# Patient Record
Sex: FEMALE | Race: WHITE | URBAN - METROPOLITAN AREA
[De-identification: names, ages, dates, MRNs, and addresses within clinical notes are randomized per-mention and may not be internally consistent; named-entity substitution may affect disease eponyms.]

---

## 2017-02-01 ENCOUNTER — TELEPHONE (OUTPATIENT)
Dept: INTERNAL MEDICINE CLINIC | Age: 57
End: 2017-02-01

## 2017-02-01 RX ORDER — ZOLPIDEM TARTRATE 5 MG/1
5 TABLET ORAL
Qty: 30 TAB | Refills: 2 | Status: SHIPPED | OUTPATIENT
Start: 2017-02-01 | End: 2018-12-10 | Stop reason: ALTCHOICE

## 2017-02-01 RX ORDER — ZOLPIDEM TARTRATE 10 MG/1
10 TABLET ORAL
Qty: 30 TAB | Refills: 2 | Status: SHIPPED | OUTPATIENT
Start: 2017-02-01 | End: 2017-02-01 | Stop reason: DRUGHIGH

## 2017-02-01 NOTE — TELEPHONE ENCOUNTER
Pt stated medication prescribed for sleep at last office visit is not helping and would like a different medication called to her pharmacy(number on file) that will help her to stay asleep.    Best contact number [phone] or ([phone].      From answering service

## 2017-02-02 ENCOUNTER — TELEPHONE (OUTPATIENT)
Dept: INTERNAL MEDICINE CLINIC | Age: 57
End: 2017-02-02

## 2017-02-02 NOTE — TELEPHONE ENCOUNTER
Pt returning call back to practice. Best contact number [phone].       From answering service

## 2017-02-02 NOTE — TELEPHONE ENCOUNTER
Spoke with the patient using 2 identifiers. Notified her per Dr Quang Wortihngton that he ordered PRN only Ambien. She states understanding. Clonidine is also for blood pressure. She states that she does not check her blood pressure. She will continue taking Clonidine.

## 2017-03-02 RX ORDER — LEVOTHYROXINE SODIUM 88 UG/1
TABLET ORAL
Qty: 90 TAB | Refills: 0 | Status: SHIPPED | OUTPATIENT
Start: 2017-03-02 | End: 2017-06-05 | Stop reason: SDUPTHER

## 2017-07-04 DIAGNOSIS — I10 HTN (HYPERTENSION), BENIGN: ICD-10-CM

## 2017-07-05 RX ORDER — CLONIDINE HYDROCHLORIDE 0.2 MG/1
TABLET ORAL
Qty: 30 TAB | Refills: 0 | Status: SHIPPED | OUTPATIENT
Start: 2017-07-05 | End: 2017-08-06 | Stop reason: SDUPTHER

## 2017-09-05 RX ORDER — LEVOTHYROXINE SODIUM 88 UG/1
TABLET ORAL
Qty: 90 TAB | Refills: 0 | Status: SHIPPED | OUTPATIENT
Start: 2017-09-05 | End: 2017-12-08 | Stop reason: SDUPTHER

## 2017-09-14 DIAGNOSIS — I10 HTN (HYPERTENSION), BENIGN: ICD-10-CM

## 2017-09-14 RX ORDER — CLONIDINE HYDROCHLORIDE 0.2 MG/1
TABLET ORAL
Qty: 30 TAB | Refills: 5 | Status: SHIPPED | OUTPATIENT
Start: 2017-09-14 | End: 2018-12-10 | Stop reason: ALTCHOICE

## 2017-09-18 DIAGNOSIS — I10 HTN (HYPERTENSION), BENIGN: ICD-10-CM

## 2017-09-18 RX ORDER — CLONIDINE HYDROCHLORIDE 0.2 MG/1
TABLET ORAL
Qty: 90 TAB | Refills: 1 | OUTPATIENT
Start: 2017-09-18

## 2017-11-03 ENCOUNTER — TELEPHONE (OUTPATIENT)
Dept: INTERNAL MEDICINE CLINIC | Age: 57
End: 2017-11-03

## 2017-11-03 NOTE — TELEPHONE ENCOUNTER
Patient called in stating that [facility] There, she was told many bloodclots are formed in here leg. One looks like a potential issue for DVT in the future. The rad report is being sent to our office and the patient would like a call back on what to do.  She was advised to go to the ER but wanted to talk to her dr valdez.  [phone]

## 2017-11-03 NOTE — TELEPHONE ENCOUNTER
Writer spoke with patient and informed patient of Dr Sudhir Hollis recommendations, patient states understanding

## 2017-11-03 NOTE — TELEPHONE ENCOUNTER
Reviewed Doppler. Extensive but superficial thrombosis. Elevate leg, most heat. Continue to walk and exercise. No risk of pulmonary embolism. Would start 325 ASA with food until resolves.

## 2017-12-10 RX ORDER — LEVOTHYROXINE SODIUM 88 UG/1
TABLET ORAL
Qty: 90 TAB | Refills: 0 | Status: SHIPPED | OUTPATIENT
Start: 2017-12-10 | End: 2018-04-07 | Stop reason: SDUPTHER

## 2018-02-24 DIAGNOSIS — I10 HTN (HYPERTENSION), BENIGN: ICD-10-CM

## 2018-02-25 RX ORDER — CLONIDINE HYDROCHLORIDE 0.2 MG/1
TABLET ORAL
Qty: 30 TAB | Refills: 0 | OUTPATIENT
Start: 2018-02-25

## 2018-03-09 NOTE — TELEPHONE ENCOUNTER
Left detailed message to inform the patient per Dr Pedrito Blackburn that she has not had lab work since 2016. Prior to any refills are given she must be seen in the office. Asked for a return call to get her scheduled.

## 2018-04-09 RX ORDER — LEVOTHYROXINE SODIUM 88 UG/1
TABLET ORAL
Qty: 90 TAB | Refills: 0 | Status: SHIPPED | OUTPATIENT
Start: 2018-04-09 | End: 2018-12-10 | Stop reason: ALTCHOICE

## 2018-12-10 ENCOUNTER — OFFICE VISIT (OUTPATIENT)
Dept: INTERNAL MEDICINE CLINIC | Age: 58
End: 2018-12-10

## 2018-12-10 VITALS
DIASTOLIC BLOOD PRESSURE: 80 MMHG | HEART RATE: 70 BPM | WEIGHT: 216 LBS | TEMPERATURE: 97.6 F | HEIGHT: 64 IN | BODY MASS INDEX: 36.88 KG/M2 | RESPIRATION RATE: 18 BRPM | OXYGEN SATURATION: 97 % | SYSTOLIC BLOOD PRESSURE: 170 MMHG

## 2018-12-10 DIAGNOSIS — E03.3 POSTINFECTIOUS HYPOTHYROIDISM: Primary | ICD-10-CM

## 2018-12-10 DIAGNOSIS — I10 HTN (HYPERTENSION), BENIGN: ICD-10-CM

## 2018-12-10 DIAGNOSIS — Z12.11 COLON CANCER SCREENING: ICD-10-CM

## 2018-12-10 DIAGNOSIS — R20.2 TINGLING IN EXTREMITIES: ICD-10-CM

## 2018-12-10 RX ORDER — LISINOPRIL AND HYDROCHLOROTHIAZIDE 10; 12.5 MG/1; MG/1
1 TABLET ORAL DAILY
Qty: 90 TAB | Refills: 0 | Status: SHIPPED | OUTPATIENT
Start: 2018-12-10 | End: 2019-04-02 | Stop reason: ALTCHOICE

## 2018-12-10 RX ORDER — LISINOPRIL AND HYDROCHLOROTHIAZIDE 10; 12.5 MG/1; MG/1
1 TABLET ORAL DAILY
Qty: 90 TAB | Refills: 0 | Status: SHIPPED | OUTPATIENT
Start: 2018-12-10 | End: 2018-12-10 | Stop reason: SDUPTHER

## 2019-02-10 RX ORDER — LEVOTHYROXINE SODIUM 88 UG/1
TABLET ORAL
Qty: 90 TAB | Refills: 0 | Status: SHIPPED | OUTPATIENT
Start: 2019-02-10 | End: 2019-04-02 | Stop reason: SDUPTHER

## 2019-04-01 LAB
ALBUMIN SERPL-MCNC: 4.4 G/DL (ref 3.5–5.5)
ALBUMIN/GLOB SERPL: 1.7 {RATIO} (ref 1.2–2.2)
ALP SERPL-CCNC: 48 IU/L (ref 39–117)
ALT SERPL-CCNC: 21 IU/L (ref 0–32)
AST SERPL-CCNC: 16 IU/L (ref 0–40)
BASOPHILS # BLD AUTO: 0 X10E3/UL (ref 0–0.2)
BASOPHILS NFR BLD AUTO: 1 %
BILIRUB SERPL-MCNC: 0.4 MG/DL (ref 0–1.2)
BUN SERPL-MCNC: 15 MG/DL (ref 6–24)
BUN/CREAT SERPL: 18 (ref 9–23)
CALCIUM SERPL-MCNC: 9.2 MG/DL (ref 8.7–10.2)
CHLORIDE SERPL-SCNC: 105 MMOL/L (ref 96–106)
CHOLEST SERPL-MCNC: 196 MG/DL (ref 100–199)
CO2 SERPL-SCNC: 23 MMOL/L (ref 20–29)
CREAT SERPL-MCNC: 0.85 MG/DL (ref 0.57–1)
EOSINOPHIL # BLD AUTO: 0.2 X10E3/UL (ref 0–0.4)
EOSINOPHIL NFR BLD AUTO: 4 %
ERYTHROCYTE [DISTWIDTH] IN BLOOD BY AUTOMATED COUNT: 13.7 % (ref 12.3–15.4)
GLOBULIN SER CALC-MCNC: 2.6 G/DL (ref 1.5–4.5)
GLUCOSE SERPL-MCNC: 104 MG/DL (ref 65–99)
HCT VFR BLD AUTO: 42.4 % (ref 34–46.6)
HDLC SERPL-MCNC: 60 MG/DL
HGB BLD-MCNC: 13.6 G/DL (ref 11.1–15.9)
IMM GRANULOCYTES # BLD AUTO: 0 X10E3/UL (ref 0–0.1)
IMM GRANULOCYTES NFR BLD AUTO: 0 %
INTERPRETATION, 910389: NORMAL
LDLC SERPL CALC-MCNC: 118 MG/DL (ref 0–99)
LYMPHOCYTES # BLD AUTO: 1.8 X10E3/UL (ref 0.7–3.1)
LYMPHOCYTES NFR BLD AUTO: 42 %
Lab: NORMAL
MCH RBC QN AUTO: 32.5 PG (ref 26.6–33)
MCHC RBC AUTO-ENTMCNC: 32.1 G/DL (ref 31.5–35.7)
MCV RBC AUTO: 101 FL (ref 79–97)
METHYLMALONATE SERPL-SCNC: 124 NMOL/L (ref 0–378)
MONOCYTES # BLD AUTO: 0.4 X10E3/UL (ref 0.1–0.9)
MONOCYTES NFR BLD AUTO: 9 %
NEUTROPHILS # BLD AUTO: 1.9 X10E3/UL (ref 1.4–7)
NEUTROPHILS NFR BLD AUTO: 44 %
PLATELET # BLD AUTO: 257 X10E3/UL (ref 150–379)
POTASSIUM SERPL-SCNC: 4.4 MMOL/L (ref 3.5–5.2)
PROT SERPL-MCNC: 7 G/DL (ref 6–8.5)
RBC # BLD AUTO: 4.19 X10E6/UL (ref 3.77–5.28)
SODIUM SERPL-SCNC: 142 MMOL/L (ref 134–144)
TRIGL SERPL-MCNC: 88 MG/DL (ref 0–149)
TSH SERPL DL<=0.005 MIU/L-ACNC: 1.54 UIU/ML (ref 0.45–4.5)
VIT B12 SERPL-MCNC: 327 PG/ML (ref 232–1245)
VLDLC SERPL CALC-MCNC: 18 MG/DL (ref 5–40)
WBC # BLD AUTO: 4.3 X10E3/UL (ref 3.4–10.8)

## 2019-04-02 ENCOUNTER — OFFICE VISIT (OUTPATIENT)
Dept: INTERNAL MEDICINE CLINIC | Age: 59
End: 2019-04-02

## 2019-04-02 VITALS
HEART RATE: 66 BPM | OXYGEN SATURATION: 97 % | WEIGHT: 214 LBS | TEMPERATURE: 96.9 F | SYSTOLIC BLOOD PRESSURE: 127 MMHG | HEIGHT: 64 IN | BODY MASS INDEX: 36.54 KG/M2 | DIASTOLIC BLOOD PRESSURE: 80 MMHG | RESPIRATION RATE: 16 BRPM

## 2019-04-02 DIAGNOSIS — I10 HTN (HYPERTENSION), BENIGN: Primary | ICD-10-CM

## 2019-04-02 DIAGNOSIS — E03.3 POSTINFECTIOUS HYPOTHYROIDISM: ICD-10-CM

## 2019-04-02 DIAGNOSIS — E66.01 SEVERE OBESITY (HCC): ICD-10-CM

## 2019-04-02 RX ORDER — LEVOTHYROXINE SODIUM 88 UG/1
TABLET ORAL
Qty: 90 TAB | Refills: 3 | Status: SHIPPED | OUTPATIENT
Start: 2019-04-02 | End: 2019-06-16 | Stop reason: SDUPTHER

## 2019-04-02 RX ORDER — VALSARTAN AND HYDROCHLOROTHIAZIDE 80; 12.5 MG/1; MG/1
1 TABLET, FILM COATED ORAL DAILY
Qty: 90 TAB | Refills: 1 | Status: SHIPPED | OUTPATIENT
Start: 2019-04-02 | End: 2019-09-26 | Stop reason: SDUPTHER

## 2019-04-02 NOTE — PROGRESS NOTES
1. Have you been to the ER, urgent care clinic since your last visit? Hospitalized since your last visit? [facility] a [facility] 2. Have you seen or consulted any other health care providers outside of the [address] [place] [address] since your last visit? Include any pap smears or colon screening.  No

## 2019-04-02 NOTE — PROGRESS NOTES
Chief Complaint Patient presents with  Hypertension  Hypothyroidism  
off meds for many months Subjective:  She was told to be hypothyroid a number of years ago. When I saw her she was taking thyroid medicine and her TSH was euthyroid. Her periods have stopped and she has had no periods for a year. She sees [facility]. She has had some weight gain. She has not had any change in her voice, bowels, skin, and no bradycardia. She has had some weight gain. Her appetite is good, her bowel function is normal.  She takes magnesium daily to help her sleep. Her insomnia continues to be an issue . She saw psychiatry for a while. She took Clonidine for a while for sleep. It was not a very good blood pressure drug. She has a strong family history for hypertension and her blood pressure two years ago was over 150. Denies chest pain or shortness of breath. Denies heart palpitations. Does not exercise. Drinks one or two ultra light beers per day and feels this is a habit as she can stop for a week and have no issues. Vitals:  
 04/02/19 1638 BP: 146/79 Pulse: 66 Resp: 16 Temp: 96.9 °F (36.1 °C) TempSrc: Oral  
SpO2: 97% Weight: 214 lb (97.1 kg) Height: 5' 4\" (1.626 m) Vitals:  
 04/02/19 1638 04/02/19 1652 BP: 146/79 127/80 Pulse: 66 Resp: 16 Temp: 96.9 °F (36.1 °C) TempSrc: Oral   
SpO2: 97% Weight: 214 lb (97.1 kg) Height: 5' 4\" (1.626 m) Physical Examination:  BP: . S1, S2 was regular without a murmur. Lungs clear. Abdomen soft. Lab Results Component Value Date/Time WBC 4.3 03/28/2019 11:11 AM  
 HGB 13.6 03/28/2019 11:11 AM  
 HCT 42.4 03/28/2019 11:11 AM  
 PLATELET 335 87/79/7464 11:11 AM  
  (H) 03/28/2019 11:11 AM  
 
Lab Results Component Value Date/Time  Cholesterol, total 196 03/28/2019 11:11 AM  
 HDL Cholesterol 60 03/28/2019 11:11 AM  
 LDL, calculated 118 (H) 03/28/2019 11:11 AM  
 Triglyceride 88 03/28/2019 11:11 AM  
 
Lab Results Component Value Date/Time GFR est non-AA 76 03/28/2019 11:11 AM  
 GFR est AA 88 03/28/2019 11:11 AM  
 Creatinine 0.85 03/28/2019 11:11 AM  
 BUN 15 03/28/2019 11:11 AM  
 Sodium 142 03/28/2019 11:11 AM  
 Potassium 4.4 03/28/2019 11:11 AM  
 Chloride 105 03/28/2019 11:11 AM  
 CO2 23 03/28/2019 11:11 AM  
 
Lab Results Component Value Date/Time TSH 1.540 03/28/2019 11:11 AM  
  
Lab Results Component Value Date/Time Glucose 104 (H) 03/28/2019 11:11 AM  
  
 
ROS:  She needs screening colonoscopy,. Her ROS is positive for tingling of the upper extremities from the wrist to the elbows. Comes spontaneously, could be carpal tunnel. She sits at the computer all day. The fingers, however, do not tingle very much. She has had no cardiovascular symptoms. Patient Active Problem List  
 Diagnosis  Severe obesity ([address]  HTN (hypertension), benign  Hypothyroid  Elevated blood pressure reading without diagnosis of hypertension Vitals:  
 04/02/19 1638 BP: 146/79 Pulse: 66 Resp: 16 Temp: 96.9 °F (36.1 °C) TempSrc: Oral  
SpO2: 97% Weight: 214 lb (97.1 kg) Height: 5' 4\" (1.626 m) Wt Readings from Last 3 Encounters:  
04/02/19 214 lb (97.1 kg) 12/10/18 216 lb (98 kg)  
11/29/16 202 lb (91.6 kg) Lab Results Component Value Date/Time TSH 1.540 03/28/2019 11:11 AM  
 
Lab Results Component Value Date/Time Cholesterol, total 196 03/28/2019 11:11 AM  
 HDL Cholesterol 60 03/28/2019 11:11 AM  
 LDL, calculated 118 (H) 03/28/2019 11:11 AM  
 VLDL, calculated 18 03/28/2019 11:11 AM  
 Triglyceride 88 03/28/2019 11:11 AM  
 
Lab Results Component Value Date/Time Glucose 104 (H) 03/28/2019 11:11 AM  
 
 
 
 
 
 
EKG normal 
 
 
 
Diagnoses and all orders for this visit: 1. HTN (hypertension), benign 2. Severe obesity ([address] 3. Postinfectious hypothyroidism Other orders -     valsartan-hydroCHLOROthiazide (DIOVAN-HCT) 80-12.5 mg per tablet; Take 1 Tab by mouth daily. -     levothyroxine (SYNTHROID) 88 mcg tablet; TAKE ONE TABLET BY MOUTH ONE TIME DAILY BEFORE BREAKFAST  Indications: hypothyroidism Weight loss advised Due to cough will change BP meds

## 2019-06-18 RX ORDER — LEVOTHYROXINE SODIUM 88 UG/1
TABLET ORAL
Qty: 90 TAB | Refills: 0 | Status: SHIPPED | OUTPATIENT
Start: 2019-06-18 | End: 2019-09-13 | Stop reason: SDUPTHER

## 2019-09-13 RX ORDER — LEVOTHYROXINE SODIUM 88 UG/1
TABLET ORAL
Qty: 30 TAB | Refills: 2 | Status: SHIPPED | OUTPATIENT
Start: 2019-09-13 | End: 2019-12-15 | Stop reason: SDUPTHER

## 2019-09-26 RX ORDER — VALSARTAN AND HYDROCHLOROTHIAZIDE 80; 12.5 MG/1; MG/1
TABLET, FILM COATED ORAL
Qty: 30 TAB | Refills: 5 | Status: SHIPPED | OUTPATIENT
Start: 2019-09-26 | End: 2020-04-02

## 2019-12-16 RX ORDER — LEVOTHYROXINE SODIUM 88 UG/1
TABLET ORAL
Qty: 30 TAB | Refills: 2 | Status: SHIPPED | OUTPATIENT
Start: 2019-12-16 | End: 2020-03-16

## 2020-03-16 RX ORDER — LEVOTHYROXINE SODIUM 88 UG/1
TABLET ORAL
Qty: 30 TAB | Refills: 2 | Status: SHIPPED | OUTPATIENT
Start: 2020-03-16 | End: 2020-06-18 | Stop reason: SDUPTHER

## 2020-04-02 RX ORDER — VALSARTAN AND HYDROCHLOROTHIAZIDE 80; 12.5 MG/1; MG/1
TABLET, FILM COATED ORAL
Qty: 30 TAB | Refills: 1 | Status: SHIPPED | OUTPATIENT
Start: 2020-04-02 | End: 2020-06-12

## 2020-06-04 RX ORDER — VALSARTAN AND HYDROCHLOROTHIAZIDE 80; 12.5 MG/1; MG/1
TABLET, FILM COATED ORAL
Qty: 30 TAB | Refills: 1 | OUTPATIENT
Start: 2020-06-04

## 2020-06-12 NOTE — TELEPHONE ENCOUNTER
Patient is out of medication and is schedule on Monday for a virtual visit with , pt has been out for a week

## 2020-06-14 RX ORDER — VALSARTAN AND HYDROCHLOROTHIAZIDE 80; 12.5 MG/1; MG/1
TABLET, FILM COATED ORAL
Qty: 30 TAB | Refills: 0 | Status: SHIPPED | OUTPATIENT
Start: 2020-06-14 | End: 2020-06-18 | Stop reason: SDUPTHER

## 2020-06-18 ENCOUNTER — VIRTUAL VISIT (OUTPATIENT)
Dept: INTERNAL MEDICINE CLINIC | Age: 60
End: 2020-06-18

## 2020-06-18 DIAGNOSIS — I10 HTN (HYPERTENSION), BENIGN: Primary | ICD-10-CM

## 2020-06-18 DIAGNOSIS — E03.3 POSTINFECTIOUS HYPOTHYROIDISM: ICD-10-CM

## 2020-06-18 RX ORDER — VALSARTAN AND HYDROCHLOROTHIAZIDE 80; 12.5 MG/1; MG/1
1 TABLET, FILM COATED ORAL DAILY
Qty: 90 TAB | Refills: 3 | Status: SHIPPED | OUTPATIENT
Start: 2020-06-18 | End: 2021-07-08 | Stop reason: DRUGHIGH

## 2020-06-18 RX ORDER — LEVOTHYROXINE SODIUM 88 UG/1
88 TABLET ORAL
Qty: 90 TAB | Refills: 3 | Status: SHIPPED | OUTPATIENT
Start: 2020-06-18 | End: 2021-07-08 | Stop reason: SDUPTHER

## 2020-06-18 NOTE — PROGRESS NOTES
THIS IS A VIRTUAL VISIT USING [employer] ME SOFTWARE    Subjective:  She was told to be hypothyroid a number of years ago. When I saw her she was taking thyroid medicine and her TSH was euthyroid. She sees [facility]. She has had some weight gain. She has not had any change in her voice, bowels, skin, and no bradycardia. She has had some weight gain. Her appetite is good, her bowel function is normal.  She takes magnesium daily to help her sleep. Her insomnia continues to be an issue . Pamalee Jignesh It was not a very good blood pressure drug. She has a strong family history for hypertension and her blood pressure two years ago was over 150. Denies chest pain or shortness of breath. Denies heart palpitations. Does not exercise. Drinks one or two ultra light beers per day and feels this is a habit as she can stop for a week and have no issues. Physical Examination:  BP: . No recent readings  Lab Results   Component Value Date/Time    WBC 4.3 03/28/2019 11:11 AM    HGB 13.6 03/28/2019 11:11 AM    HCT 42.4 03/28/2019 11:11 AM    PLATELET 686 80/22/6821 11:11 AM     (H) 03/28/2019 11:11 AM     Lab Results   Component Value Date/Time    Cholesterol, total 196 03/28/2019 11:11 AM    HDL Cholesterol 60 03/28/2019 11:11 AM    LDL, calculated 118 (H) 03/28/2019 11:11 AM    Triglyceride 88 03/28/2019 11:11 AM     Lab Results   Component Value Date/Time    GFR est non-AA 76 03/28/2019 11:11 AM    GFR est AA 88 03/28/2019 11:11 AM    Creatinine 0.85 03/28/2019 11:11 AM    BUN 15 03/28/2019 11:11 AM    Sodium 142 03/28/2019 11:11 AM    Potassium 4.4 03/28/2019 11:11 AM    Chloride 105 03/28/2019 11:11 AM    CO2 23 03/28/2019 11:11 AM     Lab Results   Component Value Date/Time    TSH 1.540 03/28/2019 11:11 AM      Lab Results   Component Value Date/Time    Glucose 104 (H) 03/28/2019 11:11 AM        ROS:  She needs screening colonoscopy,.   Her ROS is positive for tingling of the upper extremities from the wrist to the elbows. Comes spontaneously, could be carpal tunnel. She sits at the computer all day. The fingers, however, do not tingle very much. She has had no cardiovascular symptoms. Patient Active Problem List    Diagnosis    Severe obesity ([address]    HTN (hypertension), benign    Hypothyroid    Elevated blood pressure reading without diagnosis of hypertension     There were no vitals filed for this visit. Wt Readings from Last 3 Encounters:   04/02/19 214 lb (97.1 kg)   12/10/18 216 lb (98 kg)   11/29/16 202 lb (91.6 kg)     Lab Results   Component Value Date/Time    TSH 1.540 03/28/2019 11:11 AM     Lab Results   Component Value Date/Time    Cholesterol, total 196 03/28/2019 11:11 AM    HDL Cholesterol 60 03/28/2019 11:11 AM    LDL, calculated 118 (H) 03/28/2019 11:11 AM    VLDL, calculated 18 03/28/2019 11:11 AM    Triglyceride 88 03/28/2019 11:11 AM     Lab Results   Component Value Date/Time    Glucose 104 (H) 03/28/2019 11:11 AM                   Diagnoses and all orders for this visit:    1. HTN (hypertension), benign  -     CBC WITH AUTOMATED DIFF; Future  -     LIPID PANEL; Future  -     METABOLIC PANEL, COMPREHENSIVE; Future    2. Postinfectious hypothyroidism  -     TSH 3RD GENERATION; Future    Other orders  -     valsartan-hydroCHLOROthiazide (DIOVAN-HCT) 80-12.5 mg per tablet; Take 1 Tab by mouth daily. -     levothyroxine (SYNTHROID) 88 mcg tablet; Take 1 Tab by mouth Daily (before breakfast). Weight loss advised  Advise home BP monitor    Requested Prescriptions     Signed Prescriptions Disp Refills    valsartan-hydroCHLOROthiazide (DIOVAN-HCT) 80-12.5 mg per tablet 90 Tab 3     Sig: Take 1 Tab by mouth daily.  levothyroxine (SYNTHROID) 88 mcg tablet 90 Tab 3     Sig: Take 1 Tab by mouth Daily (before breakfast).      Labs ordered

## 2020-06-20 RX ORDER — LEVOTHYROXINE SODIUM 88 UG/1
TABLET ORAL
Qty: 30 TAB | Refills: 2 | Status: SHIPPED | OUTPATIENT
Start: 2020-06-20 | End: 2021-06-25 | Stop reason: SDUPTHER

## 2020-11-28 ENCOUNTER — PATIENT MESSAGE (OUTPATIENT)
Dept: INTERNAL MEDICINE CLINIC | Age: 60
End: 2020-11-28

## 2020-12-06 ENCOUNTER — PATIENT MESSAGE (OUTPATIENT)
Dept: INTERNAL MEDICINE CLINIC | Age: 60
End: 2020-12-06

## 2020-12-06 ENCOUNTER — TELEPHONE (OUTPATIENT)
Dept: INTERNAL MEDICINE CLINIC | Age: 60
End: 2020-12-06

## 2021-06-08 DIAGNOSIS — E03.3 POSTINFECTIOUS HYPOTHYROIDISM: ICD-10-CM

## 2021-06-08 DIAGNOSIS — I10 HTN (HYPERTENSION), BENIGN: Primary | ICD-10-CM

## 2021-06-25 RX ORDER — LEVOTHYROXINE SODIUM 88 UG/1
TABLET ORAL
Qty: 15 TABLET | Refills: 0 | Status: SHIPPED | OUTPATIENT
Start: 2021-06-25

## 2021-07-08 ENCOUNTER — OFFICE VISIT (OUTPATIENT)
Dept: INTERNAL MEDICINE CLINIC | Age: 61
End: 2021-07-08
Payer: COMMERCIAL

## 2021-07-08 VITALS
TEMPERATURE: 98.3 F | SYSTOLIC BLOOD PRESSURE: 145 MMHG | BODY MASS INDEX: 35.2 KG/M2 | WEIGHT: 206.2 LBS | RESPIRATION RATE: 16 BRPM | HEART RATE: 62 BPM | HEIGHT: 64 IN | DIASTOLIC BLOOD PRESSURE: 90 MMHG | OXYGEN SATURATION: 98 %

## 2021-07-08 DIAGNOSIS — I10 HTN (HYPERTENSION), BENIGN: Primary | ICD-10-CM

## 2021-07-08 DIAGNOSIS — E03.3 POSTINFECTIOUS HYPOTHYROIDISM: ICD-10-CM

## 2021-07-08 LAB
ALBUMIN SERPL-MCNC: 4.4 G/DL (ref 3.8–4.9)
ALBUMIN/GLOB SERPL: 1.7 {RATIO} (ref 1.2–2.2)
ALP SERPL-CCNC: 64 IU/L (ref 48–121)
ALT SERPL-CCNC: 20 IU/L (ref 0–32)
AST SERPL-CCNC: 15 IU/L (ref 0–40)
BASOPHILS # BLD AUTO: 0.1 X10E3/UL (ref 0–0.2)
BASOPHILS NFR BLD AUTO: 1 %
BILIRUB SERPL-MCNC: 0.6 MG/DL (ref 0–1.2)
BUN SERPL-MCNC: 14 MG/DL (ref 6–24)
BUN/CREAT SERPL: 22 (ref 9–23)
CALCIUM SERPL-MCNC: 9.7 MG/DL (ref 8.7–10.2)
CHLORIDE SERPL-SCNC: 102 MMOL/L (ref 96–106)
CHOLEST SERPL-MCNC: 205 MG/DL (ref 100–199)
CO2 SERPL-SCNC: 28 MMOL/L (ref 20–29)
CREAT SERPL-MCNC: 0.63 MG/DL (ref 0.57–1)
EOSINOPHIL # BLD AUTO: 0.2 X10E3/UL (ref 0–0.4)
EOSINOPHIL NFR BLD AUTO: 4 %
ERYTHROCYTE [DISTWIDTH] IN BLOOD BY AUTOMATED COUNT: 12.2 % (ref 11.7–15.4)
GLOBULIN SER CALC-MCNC: 2.6 G/DL (ref 1.5–4.5)
GLUCOSE SERPL-MCNC: 112 MG/DL (ref 65–99)
HCT VFR BLD AUTO: 45 % (ref 34–46.6)
HDLC SERPL-MCNC: 68 MG/DL
HGB BLD-MCNC: 15.1 G/DL (ref 11.1–15.9)
IMM GRANULOCYTES # BLD AUTO: 0 X10E3/UL (ref 0–0.1)
IMM GRANULOCYTES NFR BLD AUTO: 0 %
IMP & REVIEW OF LAB RESULTS: NORMAL
LDLC SERPL CALC-MCNC: 124 MG/DL (ref 0–99)
LYMPHOCYTES # BLD AUTO: 2.1 X10E3/UL (ref 0.7–3.1)
LYMPHOCYTES NFR BLD AUTO: 33 %
MCH RBC QN AUTO: 32.8 PG (ref 26.6–33)
MCHC RBC AUTO-ENTMCNC: 33.6 G/DL (ref 31.5–35.7)
MCV RBC AUTO: 98 FL (ref 79–97)
MONOCYTES # BLD AUTO: 0.6 X10E3/UL (ref 0.1–0.9)
MONOCYTES NFR BLD AUTO: 9 %
NEUTROPHILS # BLD AUTO: 3.3 X10E3/UL (ref 1.4–7)
NEUTROPHILS NFR BLD AUTO: 53 %
PLATELET # BLD AUTO: 224 X10E3/UL (ref 150–450)
POTASSIUM SERPL-SCNC: 4.6 MMOL/L (ref 3.5–5.2)
PROT SERPL-MCNC: 7 G/DL (ref 6–8.5)
RBC # BLD AUTO: 4.61 X10E6/UL (ref 3.77–5.28)
SODIUM SERPL-SCNC: 143 MMOL/L (ref 134–144)
TRIGL SERPL-MCNC: 74 MG/DL (ref 0–149)
TSH SERPL DL<=0.005 MIU/L-ACNC: 1.95 UIU/ML (ref 0.45–4.5)
VLDLC SERPL CALC-MCNC: 13 MG/DL (ref 5–40)
WBC # BLD AUTO: 6.3 X10E3/UL (ref 3.4–10.8)

## 2021-07-08 PROCEDURE — 99213 OFFICE O/P EST LOW 20 MIN: CPT | Performed by: INTERNAL MEDICINE

## 2021-07-08 RX ORDER — LEVOTHYROXINE SODIUM 88 UG/1
88 TABLET ORAL
Qty: 90 TABLET | Refills: 3 | Status: SHIPPED | OUTPATIENT
Start: 2021-07-08 | End: 2022-07-17

## 2021-07-08 RX ORDER — VALSARTAN AND HYDROCHLOROTHIAZIDE 160; 25 MG/1; MG/1
1 TABLET ORAL DAILY
Qty: 90 TABLET | Refills: 3 | Status: SHIPPED | OUTPATIENT
Start: 2021-07-08 | End: 2022-07-17

## 2021-07-08 NOTE — PROGRESS NOTES
Hypertension ROS: taking medications as instructed, no medication side effects noted, patient does not perform home BP monitoring, no TIA's, no chest pain on exertion, no dyspnea on exertion, no swelling of ankles, no orthostatic dizziness or lightheadedness, no palpitations, no intermittent claudication symptoms. New concerns: not vaccinated has been healthy. Vitals:    07/08/21 1005 07/08/21 1028   BP: (!) 146/84 (!) 145/90   Pulse: 62    Resp: 16    Temp: 98.3 °F (36.8 °C)    TempSrc: Temporal    SpO2: 98%    Weight: 206 lb 3.2 oz (93.5 kg)    Height: 5' 4\" (1.626 m)      no apparent distress    Hypertension Exam: BP noted to be well controlled today in office, BP noted to be mildly elevated today in office, S1, S2 normal, no gallop, no murmur, chest clear, no JVD, no HSM, no edema. Lab review: orders written for new lab studies as appropriate;  Lab Results   Component Value Date/Time    GFR est  07/06/2021 12:12 PM    GFR est non-AA 98 07/06/2021 12:12 PM    Creatinine 0.63 07/06/2021 12:12 PM    BUN 14 07/06/2021 12:12 PM    Sodium 143 07/06/2021 12:12 PM    Potassium 4.6 07/06/2021 12:12 PM    Chloride 102 07/06/2021 12:12 PM    CO2 28 07/06/2021 12:12 PM     Lab Results   Component Value Date/Time    Cholesterol, total 205 (H) 07/06/2021 12:12 PM    HDL Cholesterol 68 07/06/2021 12:12 PM    LDL, calculated 124 (H) 07/06/2021 12:12 PM    LDL, calculated 118 (H) 03/28/2019 11:11 AM    VLDL, calculated 13 07/06/2021 12:12 PM    VLDL, calculated 18 03/28/2019 11:11 AM    Triglyceride 74 07/06/2021 12:12 PM       Assessment:  Hypertension borderline controlled, will change meds. Plan: the following changes are made - see new dose valsartan. Thyroid ROS: denies fatigue, weight changes, heat/cold intolerance, bowel/skin changes or CVS symptoms. Lab Results   Component Value Date/Time    TSH 1.950 07/06/2021 12:12 PM     Exam: thyroid is normal in size without nodules or tenderness. Assessment:  hypothyroidism stable. Plan: current treatment plan effective, no change in therapy. 1. HTN (hypertension), benign  Weight loss salt restriction    2. Postinfectious hypothyroidism  Stable on present dose      Requested Prescriptions     Signed Prescriptions Disp Refills    valsartan-hydroCHLOROthiazide (DIOVAN-HCT) 160-25 mg per tablet 90 Tablet 3     Sig: Take 1 Tablet by mouth daily.  levothyroxine (SYNTHROID) 88 mcg tablet 90 Tablet 3     Sig: Take 1 Tablet by mouth Daily (before breakfast).      covid vaccinations reviewed and priority reviewed and my advice to get vaccinated reviewed

## 2021-07-08 NOTE — PROGRESS NOTES
1. Have you been to the ER, urgent care clinic since your last visit? Hospitalized since your last visit? No    2. Have you seen or consulted any other health care providers outside of the [address] [place] [address] since your last visit? Include any pap smears or colon screening.  No     Chief Complaint   Patient presents with    Hypertension    Thyroid Problem

## 2022-03-20 PROBLEM — E66.01 SEVERE OBESITY (HCC): Status: ACTIVE | Noted: 2019-04-02

## 2022-11-16 RX ORDER — VALSARTAN AND HYDROCHLOROTHIAZIDE 160; 25 MG/1; MG/1
TABLET ORAL
Qty: 30 TABLET | Refills: 0 | Status: SHIPPED | OUTPATIENT
Start: 2022-11-16

## 2022-11-16 RX ORDER — LEVOTHYROXINE SODIUM 88 UG/1
TABLET ORAL
Qty: 30 TABLET | Refills: 0 | Status: SHIPPED | OUTPATIENT
Start: 2022-11-16

## 2022-12-18 RX ORDER — VALSARTAN AND HYDROCHLOROTHIAZIDE 160; 25 MG/1; MG/1
TABLET ORAL
Qty: 30 TABLET | Refills: 0 | Status: SHIPPED | OUTPATIENT
Start: 2022-12-18

## 2022-12-18 RX ORDER — LEVOTHYROXINE SODIUM 88 UG/1
TABLET ORAL
Qty: 30 TABLET | Refills: 0 | Status: SHIPPED | OUTPATIENT
Start: 2022-12-18

## 2023-02-18 RX ORDER — VALSARTAN AND HYDROCHLOROTHIAZIDE 160; 25 MG/1; MG/1
1 TABLET ORAL DAILY
Qty: 30 TABLET | Refills: 0 | Status: SHIPPED | OUTPATIENT
Start: 2023-02-18

## 2023-02-18 RX ORDER — LEVOTHYROXINE SODIUM 88 UG/1
88 TABLET ORAL
Qty: 30 TABLET | Refills: 0 | Status: SHIPPED | OUTPATIENT
Start: 2023-02-18

## 2023-03-27 ENCOUNTER — TELEPHONE (OUTPATIENT)
Dept: INTERNAL MEDICINE CLINIC | Age: 63
End: 2023-03-27

## 2023-03-27 DIAGNOSIS — E03.3 POSTINFECTIOUS HYPOTHYROIDISM: ICD-10-CM

## 2023-03-27 DIAGNOSIS — I10 HTN (HYPERTENSION), BENIGN: Primary | ICD-10-CM

## 2023-03-27 NOTE — TELEPHONE ENCOUNTER
----- Message from Ayah Mcclure sent at 3/27/2023 12:18 PM EDT -----  Subject: Message to Provider    QUESTIONS  Information for Provider? Patient is calling because she scheduled a   Physical on 5/11/23, she would like to know if she could receive her lab   orders before her appointment. Please contact patient to let her know. please advise  ---------------------------------------------------------------------------  --------------  Coleman Jesse INFO  [phone]; OK to leave message on voicemail  ---------------------------------------------------------------------------  --------------  SCRIPT ANSWERS  Relationship to Patient?  Self

## 2023-04-01 RX ORDER — LEVOTHYROXINE SODIUM 88 UG/1
88 TABLET ORAL
Qty: 30 TABLET | Refills: 1 | Status: SHIPPED | OUTPATIENT
Start: 2023-04-01

## 2023-04-01 RX ORDER — VALSARTAN AND HYDROCHLOROTHIAZIDE 160; 25 MG/1; MG/1
1 TABLET ORAL DAILY
Qty: 30 TABLET | Refills: 1 | Status: SHIPPED | OUTPATIENT
Start: 2023-04-01

## 2023-04-25 RX ORDER — VALSARTAN AND HYDROCHLOROTHIAZIDE 160; 25 MG/1; MG/1
1 TABLET ORAL DAILY
Qty: 30 TABLET | Refills: 1 | Status: SHIPPED | OUTPATIENT
Start: 2023-04-25

## 2023-04-25 RX ORDER — LEVOTHYROXINE SODIUM 88 UG/1
88 TABLET ORAL
Qty: 30 TABLET | Refills: 1 | Status: SHIPPED | OUTPATIENT
Start: 2023-04-25

## 2023-05-03 DIAGNOSIS — E03.3 POSTINFECTIOUS HYPOTHYROIDISM: ICD-10-CM

## 2023-05-03 DIAGNOSIS — I10 HTN (HYPERTENSION), BENIGN: ICD-10-CM

## 2023-05-04 LAB
ALBUMIN SERPL-MCNC: 4 G/DL (ref 3.5–5)
ALBUMIN/GLOB SERPL: 1.3 (ref 1.1–2.2)
ALP SERPL-CCNC: 50 U/L (ref 45–117)
ALT SERPL-CCNC: 28 U/L (ref 12–78)
ANION GAP SERPL CALC-SCNC: 4 MMOL/L (ref 5–15)
AST SERPL-CCNC: 13 U/L (ref 15–37)
BASOPHILS # BLD: 0.1 K/UL (ref 0–0.1)
BASOPHILS NFR BLD: 1 % (ref 0–1)
BILIRUB SERPL-MCNC: 0.5 MG/DL (ref 0.2–1)
BUN SERPL-MCNC: 19 MG/DL (ref 6–20)
BUN/CREAT SERPL: 23 (ref 12–20)
CALCIUM SERPL-MCNC: 9.2 MG/DL (ref 8.5–10.1)
CHLORIDE SERPL-SCNC: 104 MMOL/L (ref 97–108)
CHOLEST SERPL-MCNC: 213 MG/DL
CO2 SERPL-SCNC: 29 MMOL/L (ref 21–32)
CREAT SERPL-MCNC: 0.83 MG/DL (ref 0.55–1.02)
DIFFERENTIAL METHOD BLD: ABNORMAL
EOSINOPHIL # BLD: 0.2 K/UL (ref 0–0.4)
EOSINOPHIL NFR BLD: 5 % (ref 0–7)
ERYTHROCYTE [DISTWIDTH] IN BLOOD BY AUTOMATED COUNT: 11.8 % (ref 11.5–14.5)
GLOBULIN SER CALC-MCNC: 3 G/DL (ref 2–4)
GLUCOSE SERPL-MCNC: 117 MG/DL (ref 65–100)
HCT VFR BLD AUTO: 44 % (ref 35–47)
HDLC SERPL-MCNC: 64 MG/DL
HDLC SERPL: 3.3 (ref 0–5)
HGB BLD-MCNC: 14 G/DL (ref 11.5–16)
IMM GRANULOCYTES # BLD AUTO: 0 K/UL (ref 0–0.04)
IMM GRANULOCYTES NFR BLD AUTO: 0 % (ref 0–0.5)
LDLC SERPL CALC-MCNC: 128.8 MG/DL (ref 0–100)
LYMPHOCYTES # BLD: 2 K/UL (ref 0.8–3.5)
LYMPHOCYTES NFR BLD: 38 % (ref 12–49)
MCH RBC QN AUTO: 32.6 PG (ref 26–34)
MCHC RBC AUTO-ENTMCNC: 31.8 G/DL (ref 30–36.5)
MCV RBC AUTO: 102.6 FL (ref 80–99)
MONOCYTES # BLD: 0.4 K/UL (ref 0–1)
MONOCYTES NFR BLD: 8 % (ref 5–13)
NEUTS SEG # BLD: 2.5 K/UL (ref 1.8–8)
NEUTS SEG NFR BLD: 48 % (ref 32–75)
NRBC # BLD: 0 K/UL (ref 0–0.01)
NRBC BLD-RTO: 0 PER 100 WBC
PLATELET # BLD AUTO: 210 K/UL (ref 150–400)
PMV BLD AUTO: 10.1 FL (ref 8.9–12.9)
POTASSIUM SERPL-SCNC: 4.1 MMOL/L (ref 3.5–5.1)
PROT SERPL-MCNC: 7 G/DL (ref 6.4–8.2)
RBC # BLD AUTO: 4.29 M/UL (ref 3.8–5.2)
SODIUM SERPL-SCNC: 137 MMOL/L (ref 136–145)
TRIGL SERPL-MCNC: 101 MG/DL (ref ?–150)
TSH SERPL DL<=0.05 MIU/L-ACNC: 1.78 UIU/ML (ref 0.36–3.74)
VLDLC SERPL CALC-MCNC: 20.2 MG/DL
WBC # BLD AUTO: 5.3 K/UL (ref 3.6–11)

## 2023-05-11 ENCOUNTER — OFFICE VISIT (OUTPATIENT)
Age: 63
End: 2023-05-11
Payer: COMMERCIAL

## 2023-05-11 VITALS
HEIGHT: 64 IN | RESPIRATION RATE: 14 BRPM | HEART RATE: 68 BPM | DIASTOLIC BLOOD PRESSURE: 72 MMHG | TEMPERATURE: 97.9 F | OXYGEN SATURATION: 97 % | BODY MASS INDEX: 37.73 KG/M2 | SYSTOLIC BLOOD PRESSURE: 132 MMHG | WEIGHT: 221 LBS

## 2023-05-11 DIAGNOSIS — E03.3 POSTINFECTIOUS HYPOTHYROIDISM: ICD-10-CM

## 2023-05-11 DIAGNOSIS — E66.01 SEVERE OBESITY (BMI 35.0-39.9) WITH COMORBIDITY (HCC): ICD-10-CM

## 2023-05-11 DIAGNOSIS — M54.50 BILATERAL LOW BACK PAIN WITHOUT SCIATICA, UNSPECIFIED CHRONICITY: ICD-10-CM

## 2023-05-11 DIAGNOSIS — I10 ESSENTIAL (PRIMARY) HYPERTENSION: ICD-10-CM

## 2023-05-11 DIAGNOSIS — R73.03 PREDIABETES: Primary | ICD-10-CM

## 2023-05-11 PROCEDURE — 99214 OFFICE O/P EST MOD 30 MIN: CPT | Performed by: INTERNAL MEDICINE

## 2023-05-11 PROCEDURE — 3075F SYST BP GE 130 - 139MM HG: CPT | Performed by: INTERNAL MEDICINE

## 2023-05-11 PROCEDURE — 3078F DIAST BP <80 MM HG: CPT | Performed by: INTERNAL MEDICINE

## 2023-05-11 RX ORDER — IBUPROFEN 800 MG/1
800 TABLET ORAL 2 TIMES DAILY
COMMUNITY

## 2023-05-11 RX ORDER — LEVOTHYROXINE SODIUM 88 UG/1
88 TABLET ORAL
COMMUNITY
Start: 2020-03-16 | End: 2023-05-11 | Stop reason: SDUPTHER

## 2023-05-11 RX ORDER — VALSARTAN AND HYDROCHLOROTHIAZIDE 160; 25 MG/1; MG/1
1 TABLET ORAL DAILY
COMMUNITY
Start: 2023-04-25 | End: 2023-05-11 | Stop reason: SDUPTHER

## 2023-05-11 RX ORDER — LEVOTHYROXINE SODIUM 88 UG/1
88 TABLET ORAL
Qty: 90 TABLET | Refills: 3 | Status: SHIPPED | OUTPATIENT
Start: 2023-05-11

## 2023-05-11 RX ORDER — VALSARTAN AND HYDROCHLOROTHIAZIDE 160; 25 MG/1; MG/1
1 TABLET ORAL DAILY
Qty: 90 TABLET | Refills: 3 | Status: SHIPPED | OUTPATIENT
Start: 2023-05-11

## 2023-05-11 SDOH — ECONOMIC STABILITY: HOUSING INSECURITY
IN THE LAST 12 MONTHS, WAS THERE A TIME WHEN YOU DID NOT HAVE A STEADY PLACE TO SLEEP OR SLEPT IN A SHELTER (INCLUDING NOW)?: NO

## 2023-05-11 SDOH — ECONOMIC STABILITY: INCOME INSECURITY: HOW HARD IS IT FOR YOU TO PAY FOR THE VERY BASICS LIKE FOOD, HOUSING, MEDICAL CARE, AND HEATING?: SOMEWHAT HARD

## 2023-05-11 SDOH — ECONOMIC STABILITY: FOOD INSECURITY: WITHIN THE PAST 12 MONTHS, YOU WORRIED THAT YOUR FOOD WOULD RUN OUT BEFORE YOU GOT MONEY TO BUY MORE.: NEVER TRUE

## 2023-05-11 SDOH — ECONOMIC STABILITY: FOOD INSECURITY: WITHIN THE PAST 12 MONTHS, THE FOOD YOU BOUGHT JUST DIDN'T LAST AND YOU DIDN'T HAVE MONEY TO GET MORE.: NEVER TRUE

## 2023-05-11 ASSESSMENT — PATIENT HEALTH QUESTIONNAIRE - PHQ9
SUM OF ALL RESPONSES TO PHQ QUESTIONS 1-9: 0
SUM OF ALL RESPONSES TO PHQ9 QUESTIONS 1 & 2: 0
2. FEELING DOWN, DEPRESSED OR HOPELESS: 0
1. LITTLE INTEREST OR PLEASURE IN DOING THINGS: 0
SUM OF ALL RESPONSES TO PHQ QUESTIONS 1-9: 0

## 2023-05-11 NOTE — PROGRESS NOTES
Low back pain for  months new diagnosis     Dictation on: 05/11/2023  2:52 PM by: Adelita Mason [[id number]]   Patient Active Problem List    Diagnosis Date Noted    Severe obesity (BMI 35.0-39. 9) with comorbidity ([facility] 05/11/2023    Severe obesity ([facility] 04/02/2019    HTN (hypertension), benign 11/29/2016    Hypothyroid 12/05/2014    Elevated blood pressure reading without diagnosis of hypertension 12/05/2014         No past surgical history on file. Vitals:    05/11/23 1351   BP: 132/72   Site: Left Upper Arm   Position: Sitting   Cuff Size: Medium Adult   Pulse: 68   Resp: 14   Temp: 97.9 °F (36.6 °C)   TempSrc: Temporal   SpO2: 97%   Weight: 221 lb (100.2 kg)   Height: 5' 4\" (1.626 m)     Weight - Scale: 221 lb (100.2 kg)   Body mass index is 37.93 kg/m². S1 and S2 normal, no murmurs, clicks, gallops or rubs. Regular rate and rhythm. Chest is clear; no wheezes or rales. No edema or JVD. Back exam: limited range of motion, tenderness noted central lumbar, sacroiliac joints and sciatic notches nontender, negative straight-leg raise bilaterally , normal reflexes and strength bilateral lower extremities. 1. Prediabetes  1002.45      2. Severe obesity (BMI 35.0-39. 9) with comorbidity ([facility]  Seems to be the most important issue  - External Referral To Nutrition Services    3. Bilateral low back pain without sciatica, unspecified chronicity  Basic eval weight loss would be best  - External Referral To Physical Therapy  - XR LUMBAR SPINE (2-3 VIEWS); Future  - External Referral To Nutrition Services    4. Essential (primary) hypertension  Hypertension controlled for age based on guidelines      5.  Postinfectious hypothyroidism  Lab Results   Component Value Date    TSH 1.78 05/03/2023

## 2024-05-29 RX ORDER — VALSARTAN AND HYDROCHLOROTHIAZIDE 160; 25 MG/1; MG/1
1 TABLET ORAL DAILY
Qty: 90 TABLET | Refills: 0 | Status: SHIPPED | OUTPATIENT
Start: 2024-05-29

## 2024-05-29 RX ORDER — LEVOTHYROXINE SODIUM 88 UG/1
88 TABLET ORAL
Qty: 90 TABLET | Refills: 0 | Status: SHIPPED | OUTPATIENT
Start: 2024-05-29

## 2024-08-26 RX ORDER — VALSARTAN AND HYDROCHLOROTHIAZIDE 160; 25 MG/1; MG/1
1 TABLET ORAL DAILY
Qty: 90 TABLET | Refills: 0 | OUTPATIENT
Start: 2024-08-26

## 2024-08-26 RX ORDER — LEVOTHYROXINE SODIUM 88 UG/1
88 TABLET ORAL
Qty: 90 TABLET | Refills: 0 | OUTPATIENT
Start: 2024-08-26

## 2024-08-28 RX ORDER — VALSARTAN AND HYDROCHLOROTHIAZIDE 160; 25 MG/1; MG/1
1 TABLET ORAL DAILY
Qty: 90 TABLET | Refills: 0 | OUTPATIENT
Start: 2024-08-28

## 2024-08-28 RX ORDER — LEVOTHYROXINE SODIUM 88 UG/1
88 TABLET ORAL
Qty: 90 TABLET | Refills: 0 | OUTPATIENT
Start: 2024-08-28

## 2024-09-19 ENCOUNTER — OFFICE VISIT (OUTPATIENT)
Age: 64
End: 2024-09-19

## 2024-09-19 VITALS
WEIGHT: 219 LBS | HEART RATE: 64 BPM | TEMPERATURE: 96.2 F | OXYGEN SATURATION: 92 % | RESPIRATION RATE: 18 BRPM | DIASTOLIC BLOOD PRESSURE: 79 MMHG | BODY MASS INDEX: 37.39 KG/M2 | HEIGHT: 64 IN | SYSTOLIC BLOOD PRESSURE: 156 MMHG

## 2024-09-19 DIAGNOSIS — R73.03 PREDIABETES: ICD-10-CM

## 2024-09-19 DIAGNOSIS — Z12.11 SCREEN FOR COLON CANCER: Primary | ICD-10-CM

## 2024-09-19 DIAGNOSIS — I10 ESSENTIAL (PRIMARY) HYPERTENSION: ICD-10-CM

## 2024-09-19 DIAGNOSIS — Z12.31 BREAST CANCER SCREENING BY MAMMOGRAM: ICD-10-CM

## 2024-09-19 DIAGNOSIS — Z00.01 ENCOUNTER FOR WELL ADULT EXAM WITH ABNORMAL FINDINGS: ICD-10-CM

## 2024-09-19 DIAGNOSIS — R73.09 ELEVATED GLUCOSE: ICD-10-CM

## 2024-09-19 DIAGNOSIS — E03.3 POSTINFECTIOUS HYPOTHYROIDISM: ICD-10-CM

## 2024-09-19 RX ORDER — LEVOTHYROXINE SODIUM 88 UG/1
88 TABLET ORAL
Qty: 90 TABLET | Refills: 3 | Status: SHIPPED | OUTPATIENT
Start: 2024-09-19

## 2024-09-19 RX ORDER — VALSARTAN AND HYDROCHLOROTHIAZIDE 160; 25 MG/1; MG/1
1 TABLET ORAL DAILY
Qty: 90 TABLET | Refills: 3 | Status: SHIPPED | OUTPATIENT
Start: 2024-09-19

## 2024-09-19 ASSESSMENT — PATIENT HEALTH QUESTIONNAIRE - PHQ9
SUM OF ALL RESPONSES TO PHQ QUESTIONS 1-9: 0
1. LITTLE INTEREST OR PLEASURE IN DOING THINGS: NOT AT ALL
SUM OF ALL RESPONSES TO PHQ QUESTIONS 1-9: 0
SUM OF ALL RESPONSES TO PHQ9 QUESTIONS 1 & 2: 0
2. FEELING DOWN, DEPRESSED OR HOPELESS: NOT AT ALL

## 2024-10-28 LAB — NONINV COLON CA DNA+OCC BLD SCRN STL QL: NORMAL

## 2025-01-28 LAB
BASOPHILS # BLD AUTO: 0.1 X10E3/UL (ref 0–0.2)
BASOPHILS NFR BLD AUTO: 1 %
EOSINOPHIL # BLD AUTO: 0.2 X10E3/UL (ref 0–0.4)
EOSINOPHIL NFR BLD AUTO: 4 %
ERYTHROCYTE [DISTWIDTH] IN BLOOD BY AUTOMATED COUNT: 11.8 % (ref 11.7–15.4)
HCT VFR BLD AUTO: 44.1 % (ref 34–46.6)
HGB BLD-MCNC: 14.8 G/DL (ref 11.1–15.9)
IMM GRANULOCYTES # BLD AUTO: 0 X10E3/UL (ref 0–0.1)
IMM GRANULOCYTES NFR BLD AUTO: 0 %
LYMPHOCYTES # BLD AUTO: 2 X10E3/UL (ref 0.7–3.1)
LYMPHOCYTES NFR BLD AUTO: 36 %
MCH RBC QN AUTO: 33.6 PG (ref 26.6–33)
MCHC RBC AUTO-ENTMCNC: 33.6 G/DL (ref 31.5–35.7)
MCV RBC AUTO: 100 FL (ref 79–97)
MONOCYTES # BLD AUTO: 0.5 X10E3/UL (ref 0.1–0.9)
MONOCYTES NFR BLD AUTO: 9 %
NEUTROPHILS # BLD AUTO: 2.8 X10E3/UL (ref 1.4–7)
NEUTROPHILS NFR BLD AUTO: 50 %
PLATELET # BLD AUTO: 210 X10E3/UL (ref 150–450)
RBC # BLD AUTO: 4.4 X10E6/UL (ref 3.77–5.28)
WBC # BLD AUTO: 5.6 X10E3/UL (ref 3.4–10.8)

## 2025-01-29 LAB
ALBUMIN SERPL-MCNC: 4.2 G/DL (ref 3.9–4.9)
ALP SERPL-CCNC: 58 IU/L (ref 44–121)
ALT SERPL-CCNC: 17 IU/L (ref 0–32)
AST SERPL-CCNC: 14 IU/L (ref 0–40)
BILIRUB SERPL-MCNC: 0.7 MG/DL (ref 0–1.2)
BUN SERPL-MCNC: 14 MG/DL (ref 8–27)
BUN/CREAT SERPL: 18 (ref 12–28)
CALCIUM SERPL-MCNC: 9.5 MG/DL (ref 8.7–10.3)
CHLORIDE SERPL-SCNC: 100 MMOL/L (ref 96–106)
CHOLEST SERPL-MCNC: 197 MG/DL (ref 100–199)
CO2 SERPL-SCNC: 27 MMOL/L (ref 20–29)
CREAT SERPL-MCNC: 0.78 MG/DL (ref 0.57–1)
EGFRCR SERPLBLD CKD-EPI 2021: 85 ML/MIN/1.73
GLOBULIN SER CALC-MCNC: 2.6 G/DL (ref 1.5–4.5)
GLUCOSE SERPL-MCNC: 125 MG/DL (ref 70–99)
HBA1C MFR BLD: 5.9 % (ref 4.8–5.6)
HDLC SERPL-MCNC: 62 MG/DL
IMP & REVIEW OF LAB RESULTS: NORMAL
LDLC SERPL CALC-MCNC: 121 MG/DL (ref 0–99)
POTASSIUM SERPL-SCNC: 4.3 MMOL/L (ref 3.5–5.2)
PROT SERPL-MCNC: 6.8 G/DL (ref 6–8.5)
SODIUM SERPL-SCNC: 141 MMOL/L (ref 134–144)
TRIGL SERPL-MCNC: 75 MG/DL (ref 0–149)
TSH SERPL DL<=0.005 MIU/L-ACNC: 3.29 UIU/ML (ref 0.45–4.5)
VLDLC SERPL CALC-MCNC: 14 MG/DL (ref 5–40)